# Patient Record
Sex: MALE | Race: WHITE | URBAN - METROPOLITAN AREA
[De-identification: names, ages, dates, MRNs, and addresses within clinical notes are randomized per-mention and may not be internally consistent; named-entity substitution may affect disease eponyms.]

---

## 2017-09-12 ENCOUNTER — HOSPITAL ENCOUNTER (OUTPATIENT)
Facility: CLINIC | Age: 57
Discharge: HOME | End: 2017-09-12
Attending: OPHTHALMOLOGY | Admitting: OPHTHALMOLOGY
Payer: MEDICARE

## 2017-09-12 ENCOUNTER — TRANSFERRED RECORDS (OUTPATIENT)
Dept: HEALTH INFORMATION MANAGEMENT | Facility: CLINIC | Age: 57
End: 2017-09-12

## 2017-09-12 ENCOUNTER — ANESTHESIA (OUTPATIENT)
Dept: SURGERY | Facility: CLINIC | Age: 57
End: 2017-09-12
Payer: MEDICARE

## 2017-09-12 ENCOUNTER — ANESTHESIA EVENT (OUTPATIENT)
Dept: SURGERY | Facility: CLINIC | Age: 57
End: 2017-09-12
Payer: MEDICARE

## 2017-09-12 VITALS
SYSTOLIC BLOOD PRESSURE: 123 MMHG | RESPIRATION RATE: 16 BRPM | HEIGHT: 72 IN | OXYGEN SATURATION: 95 % | TEMPERATURE: 98.2 F | BODY MASS INDEX: 23.7 KG/M2 | DIASTOLIC BLOOD PRESSURE: 73 MMHG | WEIGHT: 175 LBS

## 2017-09-12 PROCEDURE — 25000125 ZZHC RX 250

## 2017-09-12 PROCEDURE — 25000125 ZZHC RX 250: Performed by: OPHTHALMOLOGY

## 2017-09-12 PROCEDURE — 37000008 ZZH ANESTHESIA TECHNICAL FEE, 1ST 30 MIN: Performed by: OPHTHALMOLOGY

## 2017-09-12 PROCEDURE — 71000028 ZZH EYE RECOVERY PHASE 2 EACH 15 MINS: Performed by: OPHTHALMOLOGY

## 2017-09-12 PROCEDURE — A9270 NON-COVERED ITEM OR SERVICE: HCPCS | Mod: GY | Performed by: OPHTHALMOLOGY

## 2017-09-12 PROCEDURE — 27210794 ZZH OR GENERAL SUPPLY STERILE: Performed by: OPHTHALMOLOGY

## 2017-09-12 PROCEDURE — 40000170 ZZH STATISTIC PRE-PROCEDURE ASSESSMENT II: Performed by: OPHTHALMOLOGY

## 2017-09-12 PROCEDURE — 37000009 ZZH ANESTHESIA TECHNICAL FEE, EACH ADDTL 15 MIN: Performed by: OPHTHALMOLOGY

## 2017-09-12 PROCEDURE — 36000104 ZZH EYE SURGERY LEVEL 4 1ST 30 MIN: Performed by: OPHTHALMOLOGY

## 2017-09-12 PROCEDURE — 25000132 ZZH RX MED GY IP 250 OP 250 PS 637: Mod: GY | Performed by: OPHTHALMOLOGY

## 2017-09-12 PROCEDURE — S0020 INJECTION, BUPIVICAINE HYDRO: HCPCS | Performed by: OPHTHALMOLOGY

## 2017-09-12 PROCEDURE — 25000308 HC RX OP HPI UCR WEL MED 250 IP 250: Performed by: OPHTHALMOLOGY

## 2017-09-12 PROCEDURE — 25000128 H RX IP 250 OP 636: Performed by: SURGERY

## 2017-09-12 PROCEDURE — 25000128 H RX IP 250 OP 636: Performed by: OPHTHALMOLOGY

## 2017-09-12 PROCEDURE — 27210995 ZZH RX 272: Performed by: OPHTHALMOLOGY

## 2017-09-12 PROCEDURE — 25000128 H RX IP 250 OP 636: Performed by: NURSE ANESTHETIST, CERTIFIED REGISTERED

## 2017-09-12 PROCEDURE — 27211046 ZZH EYE GAS ISPAN SF6: Performed by: OPHTHALMOLOGY

## 2017-09-12 PROCEDURE — 36000105 ZZH EYE SURGERY LEVEL 4 EA 15 ADDTL MIN: Performed by: OPHTHALMOLOGY

## 2017-09-12 RX ORDER — ACETAZOLAMIDE 500 MG/1
500 CAPSULE, EXTENDED RELEASE ORAL ONCE
Status: COMPLETED | OUTPATIENT
Start: 2017-09-12 | End: 2017-09-12

## 2017-09-12 RX ORDER — FENTANYL CITRATE 50 UG/ML
INJECTION, SOLUTION INTRAMUSCULAR; INTRAVENOUS PRN
Status: DISCONTINUED | OUTPATIENT
Start: 2017-09-12 | End: 2017-09-12

## 2017-09-12 RX ORDER — ATROPINE SULFATE 10 MG/ML
SOLUTION/ DROPS OPHTHALMIC PRN
Status: DISCONTINUED | OUTPATIENT
Start: 2017-09-12 | End: 2017-09-12 | Stop reason: HOSPADM

## 2017-09-12 RX ORDER — DEXAMETHASONE SODIUM PHOSPHATE 4 MG/ML
INJECTION, SOLUTION INTRA-ARTICULAR; INTRALESIONAL; INTRAMUSCULAR; INTRAVENOUS; SOFT TISSUE PRN
Status: DISCONTINUED | OUTPATIENT
Start: 2017-09-12 | End: 2017-09-12 | Stop reason: HOSPADM

## 2017-09-12 RX ORDER — CYCLOPENTOLATE HYDROCHLORIDE 10 MG/ML
1 SOLUTION/ DROPS OPHTHALMIC
Status: COMPLETED | OUTPATIENT
Start: 2017-09-12 | End: 2017-09-12

## 2017-09-12 RX ORDER — SODIUM CHLORIDE, SODIUM LACTATE, POTASSIUM CHLORIDE, CALCIUM CHLORIDE 600; 310; 30; 20 MG/100ML; MG/100ML; MG/100ML; MG/100ML
500 INJECTION, SOLUTION INTRAVENOUS CONTINUOUS
Status: DISCONTINUED | OUTPATIENT
Start: 2017-09-12 | End: 2017-09-12 | Stop reason: HOSPADM

## 2017-09-12 RX ORDER — ONDANSETRON 2 MG/ML
INJECTION INTRAMUSCULAR; INTRAVENOUS PRN
Status: DISCONTINUED | OUTPATIENT
Start: 2017-09-12 | End: 2017-09-12

## 2017-09-12 RX ORDER — PHENYLEPHRINE HYDROCHLORIDE 25 MG/ML
1 SOLUTION/ DROPS OPHTHALMIC
Status: COMPLETED | OUTPATIENT
Start: 2017-09-12 | End: 2017-09-12

## 2017-09-12 RX ORDER — BRIMONIDINE TARTRATE 2 MG/ML
SOLUTION/ DROPS OPHTHALMIC PRN
Status: DISCONTINUED | OUTPATIENT
Start: 2017-09-12 | End: 2017-09-12 | Stop reason: HOSPADM

## 2017-09-12 RX ORDER — PROPOFOL 10 MG/ML
INJECTION, EMULSION INTRAVENOUS PRN
Status: DISCONTINUED | OUTPATIENT
Start: 2017-09-12 | End: 2017-09-12

## 2017-09-12 RX ORDER — TIMOLOL MALEATE 5 MG/ML
SOLUTION/ DROPS OPHTHALMIC PRN
Status: DISCONTINUED | OUTPATIENT
Start: 2017-09-12 | End: 2017-09-12 | Stop reason: HOSPADM

## 2017-09-12 RX ORDER — BALANCED SALT SOLUTION 6.4; .75; .48; .3; 3.9; 1.7 MG/ML; MG/ML; MG/ML; MG/ML; MG/ML; MG/ML
SOLUTION OPHTHALMIC PRN
Status: DISCONTINUED | OUTPATIENT
Start: 2017-09-12 | End: 2017-09-12 | Stop reason: HOSPADM

## 2017-09-12 RX ORDER — DEXAMETHASONE SODIUM PHOSPHATE 4 MG/ML
INJECTION, SOLUTION INTRA-ARTICULAR; INTRALESIONAL; INTRAMUSCULAR; INTRAVENOUS; SOFT TISSUE PRN
Status: DISCONTINUED | OUTPATIENT
Start: 2017-09-12 | End: 2017-09-12

## 2017-09-12 RX ADMIN — SODIUM CHLORIDE, POTASSIUM CHLORIDE, SODIUM LACTATE AND CALCIUM CHLORIDE 500 ML: 600; 310; 30; 20 INJECTION, SOLUTION INTRAVENOUS at 18:02

## 2017-09-12 RX ADMIN — PHENYLEPHRINE HYDROCHLORIDE 1 DROP: 2.5 SOLUTION/ DROPS OPHTHALMIC at 16:58

## 2017-09-12 RX ADMIN — MIDAZOLAM HYDROCHLORIDE 1 MG: 1 INJECTION, SOLUTION INTRAMUSCULAR; INTRAVENOUS at 18:57

## 2017-09-12 RX ADMIN — CYCLOPENTOLATE HYDROCHLORIDE 1 DROP: 10 SOLUTION/ DROPS OPHTHALMIC at 16:58

## 2017-09-12 RX ADMIN — DEXAMETHASONE SODIUM PHOSPHATE 8 MG: 4 INJECTION, SOLUTION INTRA-ARTICULAR; INTRALESIONAL; INTRAMUSCULAR; INTRAVENOUS; SOFT TISSUE at 18:24

## 2017-09-12 RX ADMIN — CYCLOPENTOLATE HYDROCHLORIDE 1 DROP: 10 SOLUTION/ DROPS OPHTHALMIC at 17:15

## 2017-09-12 RX ADMIN — PHENYLEPHRINE HYDROCHLORIDE 1 DROP: 2.5 SOLUTION/ DROPS OPHTHALMIC at 17:15

## 2017-09-12 RX ADMIN — ACETAZOLAMIDE 500 MG: 500 CAPSULE, EXTENDED RELEASE ORAL at 19:28

## 2017-09-12 RX ADMIN — FENTANYL CITRATE 50 MCG: 50 INJECTION, SOLUTION INTRAMUSCULAR; INTRAVENOUS at 18:17

## 2017-09-12 RX ADMIN — ONDANSETRON 4 MG: 2 INJECTION INTRAMUSCULAR; INTRAVENOUS at 18:17

## 2017-09-12 RX ADMIN — MIDAZOLAM HYDROCHLORIDE 1 MG: 1 INJECTION, SOLUTION INTRAMUSCULAR; INTRAVENOUS at 18:17

## 2017-09-12 RX ADMIN — PROPOFOL 50 MG: 10 INJECTION, EMULSION INTRAVENOUS at 18:17

## 2017-09-12 RX ADMIN — PHENYLEPHRINE HYDROCHLORIDE 1 DROP: 2.5 SOLUTION/ DROPS OPHTHALMIC at 17:04

## 2017-09-12 RX ADMIN — CYCLOPENTOLATE HYDROCHLORIDE 1 DROP: 10 SOLUTION/ DROPS OPHTHALMIC at 17:04

## 2017-09-12 ASSESSMENT — ENCOUNTER SYMPTOMS
SEIZURES: 1
DYSRHYTHMIAS: 1

## 2017-09-12 NOTE — IP AVS SNAPSHOT
MRN:[id number]                      After Visit Summary   9/12/2017    Charles Rey    MRN: [id number]           Thank you!     Thank you for choosing [facility] for your care. Our goal is always to provide you with excellent care. Hearing back from our patients is one way we can continue to improve our services. Please take a few minutes to complete the written survey that you may receive in the mail after you visit with us. Thank you!        Patient Information     Date Of Birth          1960        About your hospital stay     You were admitted on:  September 12, 2017 You last received care in the:  [facility]    You were discharged on:  September 12, 2017       Who to Call     For medical emergencies, please call 911.  For non-urgent questions about your medical care, please call your primary care provider or clinic, [phone]  For questions related to your surgery, please call your surgery clinic        Attending Provider     Provider Specialty    Julio César Melendez MD Ophthalmology       Primary Care Provider Office Phone # Fax #    Melva Mendoza [phone]      After Care Instructions     Activity       Avoid strenuous activities the next several days.                  Further instructions from your care team       [facility] Discharge  Instructions  Anesthesia (Eye Care Center)   Adult Discharge Instructions    For 24 hours after surgery    1. Get plenty of rest.  Make arrangements to have a responsible adult stay with you for at least 6 hours after you leave the hospital.  2. Do not drive or use heavy equipment for 24 hours.    3. Do not drink alcohol for 24 hours.  4. Do not sign legal documents or make important decisions for 24 hours.  5. Avoid strenuous or risky activities. You may feel lightheaded.  If so, sit for a few minutes before standing.  Have someone help you get up.   6. Conscious  sedation patients may resume a regular diet..  7. Any questions of medical nature, call your physician.    [facility], [address]  [address]  [place], [place] [id number] [phone]    PATIENT INSTRUCTIONS - RETINA SURGERY    Common retina operations    Surgery for retinal detachments.    Surgery to remove blood in the eye.    Surgery to remove scar tissue from retina.    Surgery to close macular holes.    Surgery to repair dislocated lens    After the surgery    Many retina operations involve the use of gas bubbles, or oil bubbles in the eye.  If this is true in your case, you may be asked to position a certain way following the surgery.  The nurse will go over this in detail with you.    When you go home, you can eat and drink as much as you feel comfortable.  Many retina operations make you feel less hungry or even a little nauseous.  This is to be expected.    You will be given eye drops prescriptions to fill that day.  You don t need to start the drops until the next day.  Please bring these drops with you to the doctor.      You may take a bath or shower as usual for you.  If the patch falls off, please simply leave it off.    It is normal to have some moderate discomfort, and nausea.  The doctor may give you pain medication to help with this discomfort.  If nothing was prescribed for you, you can take ibuprofen(Advil) or acetaminopen (Tylenol) as directed on the bottle. If you have significant discomfort that isn t relieved with pain medications, you should call [facility] at [phone] and speak to your doctor.    Recovering after surgery    Retina operations are not like cataract surgery.  The vision often takes weeks or months to fully improve following the surgery.  DO NOT BE DISCOURAGED IF YOU DON T SEE WELL IMMEDIATELY FOLLOWING THE SURGERY.  PATIENTS WITH GAS BUBBLES IN THE EYE CAN T SEE ANY DETAIL AT ALL UNTIL THE GAS BUBBLE RESORBS.       Patients that have a gas/air bubble placed in their eye will have a green medical alert band on their wrist.  This band should not be removed until the doctor removes it for you or gives you permission to remove it.    You cannot fly in an airplane or drive into the mountains as long as the air or gas bubble remains in your eye.    You will have eye drops to use over the first several weeks following the surgery.  The doctor will give you detailed instructions on when to take them on your post-op visit.    If positioning is required following the surgery, it usually is required for 5-7 days.    Most people do not feel able to return to work for at least one week and sometimes up to two or three weeks following the surgery.    Frequently asked questions    What do you mean when you say positioning following retinal surgery?    Gas or oil bubbles are used in retina surgery to either close holes or help keep the retina attached.  The gas bubbles rises up presses against the highest position of the eye.  Depending on the area of damage to the retina, your doctor may ask you to position on your left side, right side, face down, or upright, or some combination of these positions.  You should try to stay in that position 90% of the time, taking breaks to eat, stretch, go to the bathroom, and have a bath or shower.  You can rent a massage chair that often helps in this position.  At night you should do your best to stay in this position as well.  Your doctor knows how difficult this can be, but can t stress enough how important it is for success of the surgery.  Your doctor will tell you how long this is necessary.    What symptoms should concern me following surgery?    You should be concerned if:    The eye becomes increasingly more painful and the pain medication stops working.    The vision gets darker or dimmer.    Green thick discharge appears.    What are the drops for?    One drop will be an antibiotic.  It is  meant to prevent infection.    One drop will be Pred Forte.  It is a steroid drop.  It is meant to reduce inflammation and promote healing.  It usually is continued for the longest time and is gradually tapered over several weeks.    One drop will be atropine.  It dilates the pupil and prevents the iris from going into spasm.  It is usually used for 1-2 weeks.    Many times patients are started on other drops to lower the pressure in the eye.  These are adjusted as needed.  Sometimes, even pills are required to lower the pressure in the eye.    Warfarin Instruction     You have started taking a medicine called warfarin. This is a blood-thinning medicine (anticoagulant). It helps prevent and treat blood clots.      Before leaving the hospital, make sure you know how much to take and how long to take it.      You will need regular blood tests to make sure your blood is clotting safely. It is very important to see your doctor for regular blood tests.    Talk to your doctor before taking any new medicine (this includes over-the-counter drugs and herbal products). Many medicines can interact with warfarin. This may cause more bleeding or too much clotting.     Eating a lot of vitamin K--found in green, leafy vegetables--can change the way warfarin works in your body. Do NOT avoid these foods. Instead, try to eat the same amount each day.     Bleeding is the most common side-effect of warfarin. You may notice bleeding gums, a bloody nose, bruises and bleeding longer when you cut yourself. See a doctor at once if:   o You cough up blood  o You find blood in your stool (poop)  o You have a deep cut, or a cut that bleeds longer than 10 minutes   o You have a bad cut, hard fall, accident or hit your head (go to urgent care or the emergency room).    For women who can get pregnant: This medicine can harm an unborn baby. Be very careful not to get pregnant while taking this medicine. If you think you might be pregnant, call  "your doctor right away.    For more information, read \"Guide to Warfarin Therapy,  the booklet you received in the hospital.        Pending Results     No orders found from 9/10/2017 to 2017.            Admission Information     Date & Time Provider Department Dept. Phone    2017 Julio César Melendez MD [facility] Same Day Surgery [phone]      Your Vitals Were     Blood Pressure Temperature Respirations Height Weight Pulse Oximetry    130/86 98.2  F (36.8  C) (Temporal) 16 1.829 m (6') 79.4 kg (175 lb) 94%    BMI (Body Mass Index)                   23.73 kg/m2           MyChart Information     [facility] lets you send messages to your doctor, view your test results, renew your prescriptions, schedule appointments and more. To sign up, go to [email or web address] . Click on \"Log in\" on the left side of the screen, which will take you to the Welcome page. Then click on \"Sign up Now\" on the right side of the page.     You will be asked to enter the access code listed below, as well as some personal information. Please follow the directions to create your username and password.     Your access code is: [email or web address]  Expires: 2017  7:26 PM     Your access code will  in 90 days. If you need help or a new code, please call your [facility] clinic or [phone].        Care EveryWhere ID     This is your Care EveryWhere ID. This could be used by other organizations to access your [facility] medical records  [id number]        Equal Access to Services     PAWEL MELGAR : Hadii gagandeep guevara Sothierno, waaxda luqadaha, qaybta kaalmajose cruz cortes. So [facility] [phone].    ATENCIÓN: Si habla español, tiene a peterson disposición servicios gratuitos de asistencia lingüística. Llame al [phone].    We comply with applicable federal civil rights laws and [place] laws. We do not discriminate on the basis of race, color, national origin, age, disability sex, " sexual orientation or gender identity.               Review of your medicines      CONTINUE these medicines which have NOT CHANGED        Dose / Directions    aspirin 81 MG tablet        Dose:  81 mg   Take 81 mg by mouth daily   Refills:  0       COUMADIN PO        1.5 mg every other day and 1 mg on the other days.   Refills:  0       ESCITALOPRAM OXALATE PO        Dose:  10 mg   Take 10 mg by mouth daily   Refills:  0       GABAPENTIN PO        Dose:  300 mg   Take 300 mg by mouth 2 times daily   Refills:  0       IRON (FERROUS SULFATE) PO        Dose:  65 mg   Take 65 mg by mouth daily   Refills:  0       KEPPRA PO        Dose:  500 mg   Take 500 mg by mouth daily   Refills:  0       LISINOPRIL PO        Dose:  20 mg   Take 20 mg by mouth daily   Refills:  0       * PROTONIX PO        Dose:  40 mg   Take 40 mg by mouth every morning (before breakfast)   Refills:  0       * PANTOPRAZOLE SODIUM PO        Dose:  40 mg   Take 40 mg by mouth 2 times daily (before meals)   Refills:  0       * Notice:  This list has 2 medication(s) that are the same as other medications prescribed for you. Read the directions carefully, and ask your doctor or other care provider to review them with you.             Protect others around you: Learn how to safely use, store and throw away your medicines at www.disposemymeds.org.             Medication List: This is a list of all your medications and when to take them. Check marks below indicate your daily home schedule. Keep this list as a reference.      Medications           Morning Afternoon Evening Bedtime As Needed    aspirin 81 MG tablet   Take 81 mg by mouth daily                                COUMADIN PO   1.5 mg every other day and 1 mg on the other days.                                ESCITALOPRAM OXALATE PO   Take 10 mg by mouth daily                                GABAPENTIN PO   Take 300 mg by mouth 2 times daily                                IRON (FERROUS SULFATE) PO    Take 65 mg by mouth daily                                KEPPRA PO   Take 500 mg by mouth daily                                LISINOPRIL PO   Take 20 mg by mouth daily                                * PROTONIX PO   Take 40 mg by mouth every morning (before breakfast)                                * PANTOPRAZOLE SODIUM PO   Take 40 mg by mouth 2 times daily (before meals)                                * Notice:  This list has 2 medication(s) that are the same as other medications prescribed for you. Read the directions carefully, and ask your doctor or other care provider to review them with you.

## 2017-09-12 NOTE — DISCHARGE INSTRUCTIONS
[facility] Discharge  Instructions  Anesthesia (Eye Care Center)   Adult Discharge Instructions    For 24 hours after surgery    1. Get plenty of rest.  Make arrangements to have a responsible adult stay with you for at least 6 hours after you leave the hospital.  2. Do not drive or use heavy equipment for 24 hours.    3. Do not drink alcohol for 24 hours.  4. Do not sign legal documents or make important decisions for 24 hours.  5. Avoid strenuous or risky activities. You may feel lightheaded.  If so, sit for a few minutes before standing.  Have someone help you get up.   6. Conscious sedation patients may resume a regular diet..  7. Any questions of medical nature, call your physician.    [facility], [address]  [address]  [place] [address] [phone]    PATIENT INSTRUCTIONS - RETINA SURGERY    Common retina operations    Surgery for retinal detachments.    Surgery to remove blood in the eye.    Surgery to remove scar tissue from retina.    Surgery to close macular holes.    Surgery to repair dislocated lens    After the surgery    Many retina operations involve the use of gas bubbles, or oil bubbles in the eye.  If this is true in your case, you may be asked to position a certain way following the surgery.  The nurse will go over this in detail with you.    When you go home, you can eat and drink as much as you feel comfortable.  Many retina operations make you feel less hungry or even a little nauseous.  This is to be expected.    You will be given eye drops prescriptions to fill that day.  You don t need to start the drops until the next day.  Please bring these drops with you to the doctor.      You may take a bath or shower as usual for you.  If the patch falls off, please simply leave it off.    It is normal to have some moderate discomfort, and nausea.  The doctor may give you pain medication to help with this  discomfort.  If nothing was prescribed for you, you can take ibuprofen(Advil) or acetaminopen (Tylenol) as directed on the bottle. If you have significant discomfort that isn t relieved with pain medications, you should call [facility] at [phone] and speak to your doctor.    Recovering after surgery    Retina operations are not like cataract surgery.  The vision often takes weeks or months to fully improve following the surgery.  DO NOT BE DISCOURAGED IF YOU DON T SEE WELL IMMEDIATELY FOLLOWING THE SURGERY.  PATIENTS WITH GAS BUBBLES IN THE EYE CAN T SEE ANY DETAIL AT ALL UNTIL THE GAS BUBBLE RESORBS.      Patients that have a gas/air bubble placed in their eye will have a green medical alert band on their wrist.  This band should not be removed until the doctor removes it for you or gives you permission to remove it.    You cannot fly in an airplane or drive into the mountains as long as the air or gas bubble remains in your eye.    You will have eye drops to use over the first several weeks following the surgery.  The doctor will give you detailed instructions on when to take them on your post-op visit.    If positioning is required following the surgery, it usually is required for 5-7 days.    Most people do not feel able to return to work for at least one week and sometimes up to two or three weeks following the surgery.    Frequently asked questions    What do you mean when you say positioning following retinal surgery?    Gas or oil bubbles are used in retina surgery to either close holes or help keep the retina attached.  The gas bubbles rises up presses against the highest position of the eye.  Depending on the area of damage to the retina, your doctor may ask you to position on your left side, right side, face down, or upright, or some combination of these positions.  You should try to stay in that position 90% of the time, taking breaks to eat, stretch, go to the bathroom, and have a  bath or shower.  You can rent a massage chair that often helps in this position.  At night you should do your best to stay in this position as well.  Your doctor knows how difficult this can be, but can t stress enough how important it is for success of the surgery.  Your doctor will tell you how long this is necessary.    What symptoms should concern me following surgery?    You should be concerned if:    The eye becomes increasingly more painful and the pain medication stops working.    The vision gets darker or dimmer.    Green thick discharge appears.    What are the drops for?    One drop will be an antibiotic.  It is meant to prevent infection.    One drop will be Pred Forte.  It is a steroid drop.  It is meant to reduce inflammation and promote healing.  It usually is continued for the longest time and is gradually tapered over several weeks.    One drop will be atropine.  It dilates the pupil and prevents the iris from going into spasm.  It is usually used for 1-2 weeks.    Many times patients are started on other drops to lower the pressure in the eye.  These are adjusted as needed.  Sometimes, even pills are required to lower the pressure in the eye.

## 2017-09-12 NOTE — IP AVS SNAPSHOT
[facility] Same Day [facility]    [address]    [place] [address]    Phone:  [phone]    Fax:  [phone]                                       After Visit Summary   9/12/2017    Charles Rey    MRN: [id number]           After Visit Summary Signature Page     I have received my discharge instructions, and my questions have been answered. I have discussed any challenges I see with this plan with the nurse or doctor.    ..........................................................................................................................................  Patient/Patient Representative Signature      ..........................................................................................................................................  Patient Representative Print Name and Relationship to Patient    ..................................................               ................................................  Date                                            Time    ..........................................................................................................................................  Reviewed by Signature/Title    ...................................................              ..............................................  Date                                                            Time

## 2017-09-12 NOTE — ANESTHESIA PREPROCEDURE EVALUATION
Procedure: Procedure(s):  VITRECTOMY PARSPLANA WITH 23 GAUGE SYSTEM  Preop diagnosis: retinal detatchment right eye  No Known Allergies  There is no problem list on file for this patient.    Past Medical History:   Diagnosis Date     Acute upper gastrointestinal hemorrhage      SUNNY (acute kidney injury) (H)      Aphasia due to old cerebral infarction      Arrhythmia      Atrial fibrillation (H)      Cerebrovascular accident involving anterior circulation, right (H)     right middle cerebral artery     Depression      Depression      Embolic cerebral infarction (H)      GERD (gastroesophageal reflux disease)      Hemiplegia affecting right dominant side (H)      Hypertension      Left hand pain      Mitral valve disorder      Moyamoya disease      Past Surgical History:   Procedure Laterality Date     MAZE PROCEDURE       PERCUTANEOUS MITRAL VALVE REPAIR         No current facility-administered medications on file prior to encounter.   No current outpatient prescriptions on file prior to encounter.  /85  Temp 36.8  C (98.2  F) (Temporal)  Resp 12  Ht 1.829 m (6')  Wt 79.4 kg (175 lb)  SpO2 97%  BMI 23.73 kg/m2      Echo 2016  ECHOCARDIOGRAM      GERALD TOWNSEND  MRN:    [id number]         Accession#:   [id number]  :    1960 56 years Study Date:   2016 9:17:07 AM  Gender: M                  BP:           131/69 mmHg  Height: 183.00 cm          BSA:          2.04 m   Weight: 82.10 kg           Tech:         KALLIE                             Referring MD: JARON MATA  Site:             ANW  Reading Location: [facility] OP      Procedure: 2D, Color Doppler and Spectral Doppler.    Indication for study: MR  Cardiac Rhythm: Normal sinus.Study quality: Good.    Final Impressions:   1. Normal left ventricular size, mildly increased wall thickness, normal global systolic function, calculated EF of 60 %.   2. Severely enlarged left atrium.   3. Mild mitral valve prolapse.   4. The mitral valve is s/p  23 mm annuloflex MV ring repair, severe mitral regurgitation, eccentric jet directed anteromedially suggests posterior mitral valve leaflet dysfunction.   5. Increased left atrial pressure.    Chamber Sizes and Function  Normal left ventricular size, mildly increased wall thickness, normal global systolic function, calculated EF of 60 %. Left atrial size is severely enlarged. Left atrial pressure is increased. Right ventricular cavity size is normal, global systolic RV function is normal. RV wall thickness is normal. The right atrium is normal. Right atrial volume index is 21 ml/m . Right atrial area is 16 cm . The pulmonary artery is of normal size and origin. The sinus of Valsalva is normal sized. The ascending aorta is normal sized. The aortic arch is normal.    Valves, RV Pressures and Diastolic Function    The aortic valve is normal in structure and tricuspid, no stenosis and trivial regurgitation. The mitral valve is s/p 23 mm annuloflex MV ring repair, severe mitral regurgitation, eccentric jet directed anteromedially suggests posterior mitral valve leaflet dysfunction. There is mild holosystolic prolapse of posterior leaflet of the mitral valve. Normal diastolic function. The tricuspid valve is normal in structure. Tricuspid regurgitation is mild. The tricuspid regurgitant velocity is 2.7 m/s, the estimated right ventricular systolic pressure is 30 mmHg plus right atrial pressure. There is normal estimated pulmonary pressure by tricuspid regurgitation velocity and right atrial pressure. The pulmonic valve is normal. Mild pulmonic regurgitation is present on color flow. Pulmonary veins show a pattern of systolic flow reversal, suggestive of severe mitral regurgitation.    Masses, Effusion, Shunts  There is no pericardial effusion. The inferior vena cava is normal sized, respiratory size variation greater than 50%. No left to right shunting was detected by limited color flow Doppler interrogation of the  interatrial septum.    INR 2.7 9/11/2017  Anesthesia Evaluation     . Pt has had prior anesthetic.     No history of anesthetic complications          ROS/MED HX    ENT/Pulmonary:      (-) sleep apnea and recent URI   Neurologic: Comment: Right MCA aneursym    (+)seizures CVA (recurrent CVA's from Moyamoya disease) with deficits- right sided hemiplegia, patient able to walk ,    (-) migraines   Cardiovascular: Comment: Hx of endocarditis    (+) hypertension-Peripheral Vascular Disease (carotid artery aneursym)---. Taking blood thinners : . . . :. dysrhythmias a-fib, Irregular Heartbeat/Palpitations, valvular problems/murmurs type: MR s/p mitral valve repair 2006, now with severe MR:.       METS/Exercise Tolerance:     Hematologic:         Musculoskeletal:         GI/Hepatic: Comment: Hx of GI bleed    (+) GERD       Renal/Genitourinary:     (+) chronic renal disease,       Endo:      (-) Type II DM and thyroid disease   Psychiatric:     (+) psychiatric history depression      Infectious Disease:         Malignancy:      - no malignancy   Other:                     Physical Exam  Normal systems: cardiovascular, pulmonary and dental    Airway   Mallampati: II  TM distance: >3 FB  Neck ROM: full    Dental     Cardiovascular   Rhythm and rate: regular and normal  (+) murmur (3/6 NAVI)       Pulmonary    breath sounds clear to auscultation    Other findings: Facial asymmetry                 Anesthesia Plan      History & Physical Review  History and physical reviewed and following examination; no interval change.    ASA Status:  3 .    NPO Status:  > 8 hours    Plan for MAC Reason for MAC:  Procedure to face, neck, head or breast  PONV prophylaxis:  Ondansetron (or other 5HT-3)       Postoperative Care  Postoperative pain management:  IV analgesics.      Consents  Anesthetic plan, risks, benefits and alternatives discussed with:  Patient and Daughter/Son..                          .

## 2017-09-13 NOTE — OP NOTE
DATE OF PROCEDURE:  09/12/2017      PREOPERATIVE DIAGNOSIS:  Right retinal detachment.      POSTOPERATIVE DIAGNOSIS:  Right retinal detachment.      PROCEDURES:   1.  Right vitrectomy.   2.  Right perfluorocarbon.   3.  Right cryopexy.   4.  Right air-fluid exchange.   5.  Right endolaser.   6.  Right SF6 30% gas-gas exchange.      COMPLICATIONS:  None.      ESTIMATED BLOOD LOSS:  Less than 5 mL.      INDICATIONS:  To reattach the retina of Charles Rey.  The risks of the operation including 1:1000 risk of infection or with potential loss of all eyesight, 1:10 risk of recurrence, progression of his cataract and need to position were all explained to him.  He wished to proceed.      OPERATIVE DESCRIPTION:  Retrobulbar anesthesia was obtained uneventfully.  Following this, the right eye was prepped and draped in the usual fashion.  A wire speculum was placed in the right fornix.  The eye was entered 4 mm posterior to the limbus in the inferotemporal quadrant with a 23-gauge trocar.  It was directly visualized in the vitreous cavity and connected to 1 liter bottle of BSS to which 0.3 mL of 1:1000 adrenalin was added.  Two more 23-gauge trocars were inserted supratemporally and superonasally 4 mm posterior to the limbus.  The eye was entered with a light pipe and a vitreous cutter and a core vitrectomy was performed under wide-angle illumination.  A posterior vitreous attachment was already present.  The vitreous was removed for 360 degrees out to the vitreous base using scleral depression.  Inspection revealed a retinal detachment that extended from the 8 o'clock to the 1 o'clock meridian with 2 horseshoe retinal tears at the equator around the 11 o'clock meridian.  They were marked with endodiathermy.  Following this, perfluorocarbon was instilled in the posterior aspect of the previously-mentioned retinal tears.  Cryopexy was applied around both retinal tears as well as prophylactically around the supratemporal  and superonasal sclerotomy sites.  An air-fluid exchange was performed and the retina flattened very nicely.  Endolaser was applied for 360 degrees along the peripheral vitreous base.  Residual fluid was removed from the posterior pole.  The superonasal trocar was removed and the sclerotomy was closed with 6-0 plain gut suture.  A 30% SF6 gas-gas exchange was performed through the infusion cannula and vented through the superotemporal trocar.  Both remaining trocars were then removed and the sclerotomies were closed with 6-0 plain gut suture.  The intraocular pressure was approximately 10.  Subconjunctival injections of Ancef and Decadron were given.  Alphagan, Timolol, Betadine and atropine were instilled in the right fornix.  The speculum was removed and the eye was patched in the usual fashion.  No complications noted.         FLO MANNING MD             D: 2017 19:12   T: 2017 11:25   MT: TS      Name:     GERALD TOWNSEND   MRN:      [id number]        Account:        [id number]   :      1960           Procedure Date: 2017      Document: [id number]

## 2017-09-13 NOTE — BRIEF OP NOTE
preop dx - right rd  Post op dx - same  Proc - right ppv, pfc, cryo, afx, el, sf6 30%  Comp - none  ebl - zero

## 2017-09-13 NOTE — ANESTHESIA POSTPROCEDURE EVALUATION
Patient: Charles Rey    Procedure(s):  RIGHT EYE 23 GAUGE VITRECTOMY, AIR/FLUID EXCHANGE, PERFLUORON, CRYOTHERAPY, ENDOLASER, INFUSION OF 30% SF6 GAS - Wound Class: I-Clean   - Wound Class: I-Clean    Diagnosis:retinal detatchment right eye  Diagnosis Additional Information: No value filed.    Anesthesia Type:  MAC    Note:  Anesthesia Post Evaluation    Patient location during evaluation: PACU  Patient participation: Able to fully participate in evaluation  Level of consciousness: awake and alert  Pain management: adequate  Airway patency: patent  Cardiovascular status: acceptable and hemodynamically stable  Respiratory status: acceptable and unassisted  Hydration status: acceptable  PONV: none     Anesthetic complications: None          Last vitals:  Vitals:    09/12/17 1600 09/12/17 1917 09/12/17 1931   BP: 127/85 130/86 123/73   Resp: 12 16 16   Temp: 36.8  C (98.2  F)     SpO2: 97% 94% 95%         Electronically Signed By: Jah Martinez MD  September 12, 2017  9:20 PM

## 2017-09-13 NOTE — OR NURSING
Green Armband applied to left wrist. Patient instructed to not remove armband until told to do so by physician.     POST-OP POSITION: FACE DOWN     Juliano Wetzel RN

## 2017-09-21 NOTE — ADDENDUM NOTE
Addendum  created 09/21/17 1055 by Rafia Candelario APRN CRNA    Anesthesia Event edited, Sign clinical note

## 2017-09-21 NOTE — ANESTHESIA CARE TRANSFER NOTE
Patient: Charles Rey    Procedure(s):  RIGHT EYE 23 GAUGE VITRECTOMY, AIR/FLUID EXCHANGE, PERFLUORON, CRYOTHERAPY, ENDOLASER, INFUSION OF 30% SF6 GAS - Wound Class: I-Clean   - Wound Class: I-Clean    Diagnosis: retinal detatchment right eye  Diagnosis Additional Information: No value filed.    Anesthesia Type:   MAC     Note:  Airway :Room Air  Patient transferred to:PACU        Vitals: (Last set prior to Anesthesia Care Transfer)    CRNA VITALS  9/12/2017 1841 - 9/12/2017 1941      9/12/2017             SpO2: 94 %    Resp Rate (set): 10                Electronically Signed By: MALIA Mejias CRNA  September 21, 2017  10:53 AM

## (undated) DEVICE — NDL 18GA 1.5" 305196

## (undated) DEVICE — EYE SHIELD PLASTIC

## (undated) DEVICE — GLOVE PROTEXIS MICRO 7.0  2D73PM70

## (undated) DEVICE — EYE PACK 23GA CONSTELLATION PPK4254-03

## (undated) DEVICE — DRSG STERI STRIP 1/2X4" R1547

## (undated) DEVICE — EYE SOL BSS 500ML

## (undated) DEVICE — SYR 05ML SLIP TIP W/O NDL 309647

## (undated) DEVICE — LINEN TOWEL PACK X5 5464

## (undated) DEVICE — EYE NDL RETROBULBAR 25GA 1.5" 581275

## (undated) DEVICE — PACK VITRECTOMY PQ15VI57E

## (undated) DEVICE — EYE PERFLUORON KIT 5ML 8065900163

## (undated) DEVICE — FORCEP TIP ILM 23GA 703.44

## (undated) DEVICE — EYE DRSG PAD OVAL

## (undated) DEVICE — DRAPE MICROSCOPE DRAPE  EYE DI40001

## (undated) DEVICE — SYR 05ML SLIP TIP W/O NDL

## (undated) DEVICE — TAPE MICROPORE 1"X1.5YD 1530S-1

## (undated) DEVICE — GLOVE PROTEXIS MICRO 7.5  2D73PM75

## (undated) DEVICE — EYE GAS SF6 PER USE/CC/ML 8065797001

## (undated) RX ORDER — ONDANSETRON 2 MG/ML
INJECTION INTRAMUSCULAR; INTRAVENOUS
Status: DISPENSED
Start: 2017-09-12

## (undated) RX ORDER — FENTANYL CITRATE 50 UG/ML
INJECTION, SOLUTION INTRAMUSCULAR; INTRAVENOUS
Status: DISPENSED
Start: 2017-09-12

## (undated) RX ORDER — DEXAMETHASONE SODIUM PHOSPHATE 4 MG/ML
INJECTION, SOLUTION INTRA-ARTICULAR; INTRALESIONAL; INTRAMUSCULAR; INTRAVENOUS; SOFT TISSUE
Status: DISPENSED
Start: 2017-09-12

## (undated) RX ORDER — ACETAZOLAMIDE 500 MG/1
CAPSULE, EXTENDED RELEASE ORAL
Status: DISPENSED
Start: 2017-09-12

## (undated) RX ORDER — BRIMONIDINE TARTRATE 2 MG/ML
SOLUTION/ DROPS OPHTHALMIC
Status: DISPENSED
Start: 2017-09-12